# Patient Record
Sex: FEMALE | Race: OTHER | HISPANIC OR LATINO | Employment: UNEMPLOYED | URBAN - METROPOLITAN AREA
[De-identification: names, ages, dates, MRNs, and addresses within clinical notes are randomized per-mention and may not be internally consistent; named-entity substitution may affect disease eponyms.]

---

## 2021-06-08 ENCOUNTER — HOSPITAL ENCOUNTER (EMERGENCY)
Facility: HOSPITAL | Age: 61
Discharge: HOME | End: 2021-06-08
Attending: EMERGENCY MEDICINE | Admitting: EMERGENCY MEDICINE

## 2021-06-08 ENCOUNTER — APPOINTMENT (EMERGENCY)
Dept: RADIOLOGY | Facility: HOSPITAL | Age: 61
End: 2021-06-08

## 2021-06-08 VITALS
SYSTOLIC BLOOD PRESSURE: 131 MMHG | HEART RATE: 67 BPM | BODY MASS INDEX: 29.02 KG/M2 | OXYGEN SATURATION: 98 % | TEMPERATURE: 98.4 F | DIASTOLIC BLOOD PRESSURE: 64 MMHG | WEIGHT: 163.8 LBS | HEIGHT: 63 IN | RESPIRATION RATE: 18 BRPM

## 2021-06-08 DIAGNOSIS — R42 LIGHTHEADEDNESS: Primary | ICD-10-CM

## 2021-06-08 LAB
ABO GROUP BLD: NORMAL
ABO GROUP BLD: NORMAL
ANION GAP SERPL CALCULATED.3IONS-SCNC: 15 MMOL/L (ref 4–13)
APTT PPP: 30 SECONDS (ref 23–37)
ATRIAL RATE: 77 BPM
BASOPHILS # BLD AUTO: 0.04 THOUSANDS/ΜL (ref 0–0.1)
BASOPHILS NFR BLD AUTO: 1 % (ref 0–1)
BLD GP AB SCN SERPL QL: NEGATIVE
BUN SERPL-MCNC: 16 MG/DL (ref 5–25)
CALCIUM SERPL-MCNC: 8.9 MG/DL (ref 8.3–10.1)
CHLORIDE SERPL-SCNC: 104 MMOL/L (ref 100–108)
CO2 SERPL-SCNC: 25 MMOL/L (ref 21–32)
CREAT SERPL-MCNC: 0.65 MG/DL (ref 0.6–1.3)
EOSINOPHIL # BLD AUTO: 0.15 THOUSAND/ΜL (ref 0–0.61)
EOSINOPHIL NFR BLD AUTO: 2 % (ref 0–6)
ERYTHROCYTE [DISTWIDTH] IN BLOOD BY AUTOMATED COUNT: 17.2 % (ref 11.6–15.1)
GFR SERPL CREATININE-BSD FRML MDRD: 97 ML/MIN/1.73SQ M
GLUCOSE SERPL-MCNC: 113 MG/DL (ref 65–140)
HCT VFR BLD AUTO: 41.2 % (ref 34.8–46.1)
HGB BLD-MCNC: 11.9 G/DL (ref 11.5–15.4)
IMM GRANULOCYTES # BLD AUTO: 0.01 THOUSAND/UL (ref 0–0.2)
IMM GRANULOCYTES NFR BLD AUTO: 0 % (ref 0–2)
INR PPP: 1 (ref 0.84–1.19)
LYMPHOCYTES # BLD AUTO: 1.67 THOUSANDS/ΜL (ref 0.6–4.47)
LYMPHOCYTES NFR BLD AUTO: 27 % (ref 14–44)
MCH RBC QN AUTO: 21 PG (ref 26.8–34.3)
MCHC RBC AUTO-ENTMCNC: 28.9 G/DL (ref 31.4–37.4)
MCV RBC AUTO: 73 FL (ref 82–98)
MONOCYTES # BLD AUTO: 0.7 THOUSAND/ΜL (ref 0.17–1.22)
MONOCYTES NFR BLD AUTO: 11 % (ref 4–12)
NEUTROPHILS # BLD AUTO: 3.57 THOUSANDS/ΜL (ref 1.85–7.62)
NEUTS SEG NFR BLD AUTO: 59 % (ref 43–75)
NRBC BLD AUTO-RTO: 0 /100 WBCS
P AXIS: 70 DEGREES
PLATELET # BLD AUTO: 232 THOUSANDS/UL (ref 149–390)
PMV BLD AUTO: 10.9 FL (ref 8.9–12.7)
POTASSIUM SERPL-SCNC: 3.7 MMOL/L (ref 3.5–5.3)
PR INTERVAL: 146 MS
PROTHROMBIN TIME: 13.4 SECONDS (ref 11.6–14.5)
QRS AXIS: 56 DEGREES
QRSD INTERVAL: 84 MS
QT INTERVAL: 396 MS
QTC INTERVAL: 448 MS
RBC # BLD AUTO: 5.67 MILLION/UL (ref 3.81–5.12)
RH BLD: POSITIVE
RH BLD: POSITIVE
SODIUM SERPL-SCNC: 144 MMOL/L (ref 136–145)
SPECIMEN EXPIRATION DATE: NORMAL
T WAVE AXIS: 36 DEGREES
TROPONIN I SERPL-MCNC: <0.02 NG/ML
VENTRICULAR RATE: 77 BPM
WBC # BLD AUTO: 6.14 THOUSAND/UL (ref 4.31–10.16)

## 2021-06-08 PROCEDURE — 85730 THROMBOPLASTIN TIME PARTIAL: CPT | Performed by: PHYSICIAN ASSISTANT

## 2021-06-08 PROCEDURE — 99284 EMERGENCY DEPT VISIT MOD MDM: CPT

## 2021-06-08 PROCEDURE — 85025 COMPLETE CBC W/AUTO DIFF WBC: CPT | Performed by: PHYSICIAN ASSISTANT

## 2021-06-08 PROCEDURE — 85610 PROTHROMBIN TIME: CPT | Performed by: PHYSICIAN ASSISTANT

## 2021-06-08 PROCEDURE — 93010 ELECTROCARDIOGRAM REPORT: CPT | Performed by: INTERNAL MEDICINE

## 2021-06-08 PROCEDURE — 93005 ELECTROCARDIOGRAM TRACING: CPT

## 2021-06-08 PROCEDURE — 86850 RBC ANTIBODY SCREEN: CPT | Performed by: EMERGENCY MEDICINE

## 2021-06-08 PROCEDURE — 71045 X-RAY EXAM CHEST 1 VIEW: CPT

## 2021-06-08 PROCEDURE — 86901 BLOOD TYPING SEROLOGIC RH(D): CPT | Performed by: EMERGENCY MEDICINE

## 2021-06-08 PROCEDURE — 99285 EMERGENCY DEPT VISIT HI MDM: CPT | Performed by: PHYSICIAN ASSISTANT

## 2021-06-08 PROCEDURE — 86900 BLOOD TYPING SEROLOGIC ABO: CPT | Performed by: EMERGENCY MEDICINE

## 2021-06-08 PROCEDURE — 84484 ASSAY OF TROPONIN QUANT: CPT | Performed by: PHYSICIAN ASSISTANT

## 2021-06-08 PROCEDURE — 80048 BASIC METABOLIC PNL TOTAL CA: CPT | Performed by: PHYSICIAN ASSISTANT

## 2021-06-08 PROCEDURE — 36415 COLL VENOUS BLD VENIPUNCTURE: CPT | Performed by: PHYSICIAN ASSISTANT

## 2021-06-08 NOTE — DISCHARGE INSTRUCTIONS
Drink plenty of fluids and stay hydrated  Please follow-up with your family doctor  Return to the ER with any worsening symptoms

## 2021-06-08 NOTE — ED PROVIDER NOTES
History  Chief Complaint   Patient presents with    Dizziness     reports feeling lightheaded for a few days, hx of anemia requiring infusions  reports similar symptoms when she needs infusion  reports she has not been taking her iron supplement     67yo female with a history of anemia presenting for evaluation of lightheadedness x 3 weeks  Patient admits to intermittent dizziness for several weeks  She denies any known triggers and states she can just be sitting in a chair when she feels lightheaded  She is worried that she is anemic because she's had similar symptoms in the past and has required blood transfusions  Patient also reports intermittent substernal chest discomfort and dyspnea  She is prescribed an iron supplement but breaks the pill into pieces and only takes a small piece per day because the iron supplements give her constipation  Patient's anemia was reportedly thought to be related to heavy menstrual periods and uterine fibroids  Patient has not had a menstrual period in about a year  She runs daily and denies any dyspnea or dizziness with exertion  History provided by:  Patient   used: No    Dizziness  Quality:  Lightheadedness  Severity:  Moderate  Onset quality:  Gradual  Duration:  3 weeks  Timing:  Intermittent  Progression:  Unchanged  Chronicity:  Recurrent  Relieved by:  Nothing  Worsened by:  Nothing  Ineffective treatments:  None tried  Associated symptoms: chest pain and shortness of breath    Associated symptoms: no blood in stool, no diarrhea, no headaches, no nausea, no palpitations, no syncope, no vision changes, no vomiting and no weakness    Risk factors: anemia        None       Past Medical History:   Diagnosis Date    Anemia     Gallstones        History reviewed  No pertinent surgical history  History reviewed  No pertinent family history  I have reviewed and agree with the history as documented      E-Cigarette/Vaping    E-Cigarette Use Never User      E-Cigarette/Vaping Substances     Social History     Tobacco Use    Smoking status: Never Smoker    Smokeless tobacco: Never Used   Substance Use Topics    Alcohol use: Yes     Frequency: 4 or more times a week     Drinks per session: 1 or 2     Comment: daily glass of wine    Drug use: Never       Review of Systems   Constitutional: Negative for chills and fever  HENT: Negative for drooling and voice change  Eyes: Negative for discharge and redness  Respiratory: Positive for shortness of breath  Negative for stridor  Cardiovascular: Positive for chest pain  Negative for palpitations, leg swelling and syncope  Gastrointestinal: Negative for blood in stool, diarrhea, nausea and vomiting  Musculoskeletal: Negative for neck pain and neck stiffness  Skin: Negative for color change and rash  Neurological: Positive for dizziness and light-headedness  Negative for seizures, syncope, weakness and headaches  Psychiatric/Behavioral: Negative for confusion  The patient is not nervous/anxious  All other systems reviewed and are negative  Physical Exam  Physical Exam  Vitals signs and nursing note reviewed  Constitutional:       General: She is not in acute distress  Appearance: She is well-developed  She is not diaphoretic  Comments: Non-toxic appearing   HENT:      Head: Normocephalic and atraumatic  Right Ear: External ear normal       Left Ear: External ear normal       Nose: Nose normal    Eyes:      General: No scleral icterus  Right eye: No discharge  Left eye: No discharge  Conjunctiva/sclera: Conjunctivae normal    Neck:      Musculoskeletal: Normal range of motion and neck supple  Cardiovascular:      Rate and Rhythm: Normal rate and regular rhythm  Heart sounds: Normal heart sounds  No murmur  Pulmonary:      Effort: Pulmonary effort is normal  No respiratory distress  Breath sounds: Normal breath sounds  No stridor   No wheezing or rales  Abdominal:      General: Bowel sounds are normal  There is no distension  Palpations: Abdomen is soft  Tenderness: There is no abdominal tenderness  There is no guarding  Musculoskeletal: Normal range of motion  General: No deformity  Lymphadenopathy:      Cervical: No cervical adenopathy  Skin:     General: Skin is warm and dry  Neurological:      Mental Status: She is alert  She is not disoriented  GCS: GCS eye subscore is 4  GCS verbal subscore is 5  GCS motor subscore is 6     Psychiatric:         Behavior: Behavior normal          Vital Signs  ED Triage Vitals   Temperature Pulse Respirations Blood Pressure SpO2   06/08/21 1505 06/08/21 1505 06/08/21 1505 06/08/21 1505 06/08/21 1505   98 4 °F (36 9 °C) 81 16 134/87 99 %      Temp src Heart Rate Source Patient Position - Orthostatic VS BP Location FiO2 (%)   -- 06/08/21 1715 06/08/21 1715 06/08/21 1715 --    Monitor Lying Right arm       Pain Score       06/08/21 1505       No Pain           Vitals:    06/08/21 1505 06/08/21 1715   BP: 134/87 131/64   Pulse: 81 67   Patient Position - Orthostatic VS:  Lying         Visual Acuity      ED Medications  Medications - No data to display    Diagnostic Studies  Results Reviewed     Procedure Component Value Units Date/Time    Troponin I [[id number]]  (Normal) Collected: 06/08/21 1635    Lab Status: Final result Specimen: Blood from Arm, Left Updated: 06/08/21 1706     Troponin I <0 02 ng/mL     Protime-INR [[id number]]  (Normal) Collected: 06/08/21 1635    Lab Status: Final result Specimen: Blood from Arm, Left Updated: 06/08/21 1703     Protime 13 4 seconds      INR 1 00    APTT [[id number]]  (Normal) Collected: 06/08/21 1635    Lab Status: Final result Specimen: Blood from Arm, Left Updated: 06/08/21 1703     PTT 30 seconds     Basic metabolic panel [[id number]]  (Abnormal) Collected: 06/08/21 1635    Lab Status: Final result Specimen: Blood from Arm, Left Updated: 06/08/21 1659     Sodium 144 mmol/L      Potassium 3 7 mmol/L      Chloride 104 mmol/L      CO2 25 mmol/L      ANION GAP 15 mmol/L      BUN 16 mg/dL      Creatinine 0 65 mg/dL      Glucose 113 mg/dL      Calcium 8 9 mg/dL      eGFR 97 ml/min/1 73sq m     Narrative:      [place] guidelines for Chronic Kidney Disease (CKD):     Stage 1 with normal or high GFR (GFR > 90 mL/min/1 73 square meters)    Stage 2 Mild CKD (GFR = 60-89 mL/min/1 73 square meters)    Stage 3A Moderate CKD (GFR = 45-59 mL/min/1 73 square meters)    Stage 3B Moderate CKD (GFR = 30-44 mL/min/1 73 square meters)    Stage 4 Severe CKD (GFR = 15-29 mL/min/1 73 square meters)    Stage 5 End Stage CKD (GFR <15 mL/min/1 73 square meters)  Note: GFR calculation is accurate only with a steady state creatinine    CBC and differential [[id number]]  (Abnormal) Collected: 06/08/21 1635    Lab Status: Final result Specimen: Blood from Arm, Left Updated: 06/08/21 1646     WBC 6 14 Thousand/uL      RBC 5 67 Million/uL      Hemoglobin 11 9 g/dL      Hematocrit 41 2 %      MCV 73 fL      MCH 21 0 pg      MCHC 28 9 g/dL      RDW 17 2 %      MPV 10 9 fL      Platelets 347 Thousands/uL      nRBC 0 /100 WBCs      Neutrophils Relative 59 %      Immat GRANS % 0 %      Lymphocytes Relative 27 %      Monocytes Relative 11 %      Eosinophils Relative 2 %      Basophils Relative 1 %      Neutrophils Absolute 3 57 Thousands/µL      Immature Grans Absolute 0 01 Thousand/uL      Lymphocytes Absolute 1 67 Thousands/µL      Monocytes Absolute 0 70 Thousand/µL      Eosinophils Absolute 0 15 Thousand/µL      Basophils Absolute 0 04 Thousands/µL                  XR chest 1 view portable   ED Interpretation by Katarina Fletcher PA-C (06/08 1649)   No acute abnormality                 Procedures  ECG 12 Lead Documentation Only    Date/Time: 6/8/2021 4:39 PM  Performed by: Katarina Fletcher PA-C  Authorized by: Katarina Fletcher PA-C Indications / Diagnosis:  Dizziness  ECG reviewed by me, the ED Provider: yes    Patient location:  ED  Previous ECG:     Previous ECG:  Unavailable  Interpretation:     Interpretation: normal    Rate:     ECG rate:  77    ECG rate assessment: normal    Rhythm:     Rhythm: sinus rhythm    Ectopy:     Ectopy: none    QRS:     QRS axis:  Normal    QRS intervals:  Normal  Conduction:     Conduction: normal    ST segments:     ST segments:  Normal  T waves:     T waves: normal               ED Course  ED Course as of Jun 08 1905   Tue Jun 08, 2021   1646 Hemoglobin: 11 9                     MDM  Number of Diagnoses or Management Options  Lightheadedness: new and requires workup  Diagnosis management comments: 67yo female presenting for intermittent lightheadedness x 3 weeks  Also is having periodic chest pains  Patient is worried that she may be anemia as she has required transfusions in the past  Patient is afebrile and hemodynamically stable  Differential diagnosis includes but is not limited to: symptomatic anemia, dehydration, hypotension, ACS    Initial ED plan: Check cardiac labs, coags, type and screen, EKG, and CXR  Final assessment: Labs unremarkable  Hemoglobin is normal at 11 9, no signs of anemia  Electrolytes, renal function, coags are normal  Troponin normal and EKG reveals NSR without ischemic changes  CXR is unremarkable per my interpretation  No indication for admission at this time  PCP referral provided for f/u  ED return precautions discussed  Patient expressed understanding and is agreeable to plan  Patient discharged in stable condition           Amount and/or Complexity of Data Reviewed  Clinical lab tests: ordered and reviewed  Tests in the radiology section of CPT®: reviewed and ordered  Tests in the medicine section of CPT®: ordered and reviewed  Review and summarize past medical records: yes  Independent visualization of images, tracings, or specimens: yes    Risk of Complications, Morbidity, and/or Mortality  Presenting problems: moderate  Diagnostic procedures: moderate  Management options: moderate    Patient Progress  Patient progress: stable      Disposition  Final diagnoses:   Lightheadedness     Time reflects when diagnosis was documented in both MDM as applicable and the Disposition within this note     Time User Action Codes Description Comment    6/8/2021  5:15 PM [address] [place] [address] Pam [[id number]] [facility]       ED Disposition     ED Disposition Condition Date/Time Comment    Discharge Stable Tue Jun 8, 2021  5:15 PM Ara Kwok discharge to home/self care  Follow-up Information     Follow up With Specialties Details Why Contact Info Additional 94 [facility] Internal Medicine Schedule an appointment as soon as possible for a visit   35[address] [facility] [address] [facility] [id number]  [facility] [address], [address] [address], [place], [place], [address]   [address] Willian Barton MD General Surgery Schedule an appointment as soon as possible for a visit   16[address] [facility]  [address] Emergency Department Emergency Medicine  If symptoms worsen [address] Emergency Department, [address], [place], [address]          There are no discharge medications for this patient  No discharge procedures on file      PDMP Review     None          ED Provider  Electronically Signed by           Hayley Dsouza PA-C  06/08/21 0554

## 2021-06-11 ENCOUNTER — IMMUNIZATIONS (OUTPATIENT)
Dept: FAMILY MEDICINE CLINIC | Facility: HOSPITAL | Age: 61
End: 2021-06-11

## 2021-06-11 ENCOUNTER — HOSPITAL ENCOUNTER (EMERGENCY)
Facility: HOSPITAL | Age: 61
Discharge: HOME | End: 2021-06-11
Attending: EMERGENCY MEDICINE | Admitting: EMERGENCY MEDICINE

## 2021-06-11 VITALS
RESPIRATION RATE: 16 BRPM | OXYGEN SATURATION: 100 % | SYSTOLIC BLOOD PRESSURE: 176 MMHG | TEMPERATURE: 97.9 F | DIASTOLIC BLOOD PRESSURE: 81 MMHG | HEART RATE: 60 BPM

## 2021-06-11 DIAGNOSIS — S40.021A SUPERFICIAL BRUISING OF ARM, RIGHT, INITIAL ENCOUNTER: Primary | ICD-10-CM

## 2021-06-11 PROCEDURE — 0031A: CPT

## 2021-06-11 PROCEDURE — 91303: CPT

## 2021-06-11 PROCEDURE — 99283 EMERGENCY DEPT VISIT LOW MDM: CPT

## 2021-06-11 PROCEDURE — 99282 EMERGENCY DEPT VISIT SF MDM: CPT | Performed by: PHYSICIAN ASSISTANT

## 2021-06-12 NOTE — ED PROVIDER NOTES
History  Chief Complaint   Patient presents with    Arm Pain     pt states she had her j and j vaccine today R arm and now has ecchymosis R forearm  denies injury  62 yo female here with bruising of right arm  Had J&J covid vaccine today  Contacted the help line and ultimately spoke with the FDA who recommended she to go ER to r/o blood clot  She has minimal pain around bruising  Bruising is in forearm, distal to where her shot was given  No other complaints  No swelling  History provided by:  Patient   used: No    Arm Pain  Location:  Right forearm  Quality:  Bruising  Severity:  Mild  Onset quality:  Sudden  Duration:  1 day  Timing:  Constant  Progression:  Unchanged  Chronicity:  New  Associated symptoms: no abdominal pain, no chest pain, no cough, no ear pain, no fever, no rash, no shortness of breath, no sore throat and no vomiting        None       Past Medical History:   Diagnosis Date    Anemia     Gallstones        No past surgical history on file  No family history on file  I have reviewed and agree with the history as documented  E-Cigarette/Vaping    E-Cigarette Use Never User      E-Cigarette/Vaping Substances     Social History     Tobacco Use    Smoking status: Never Smoker    Smokeless tobacco: Never Used   Substance Use Topics    Alcohol use: Yes     Frequency: 4 or more times a week     Drinks per session: 1 or 2     Comment: daily glass of wine    Drug use: Never       Review of Systems   Constitutional: Negative for chills and fever  HENT: Negative for ear pain and sore throat  Eyes: Negative for pain and visual disturbance  Respiratory: Negative for cough and shortness of breath  Cardiovascular: Negative for chest pain and palpitations  Gastrointestinal: Negative for abdominal pain and vomiting  Genitourinary: Negative for dysuria and hematuria  Musculoskeletal: Negative for arthralgias and back pain     Skin: Negative for color change and rash  Neurological: Negative for seizures and syncope  All other systems reviewed and are negative  Physical Exam  Physical Exam  Vitals signs and nursing note reviewed  Constitutional:       General: She is not in acute distress  Appearance: She is well-developed  HENT:      Head: Normocephalic and atraumatic  Eyes:      Conjunctiva/sclera: Conjunctivae normal    Neck:      Musculoskeletal: Neck supple  Cardiovascular:      Rate and Rhythm: Normal rate and regular rhythm  Heart sounds: No murmur  Pulmonary:      Effort: Pulmonary effort is normal  No respiratory distress  Breath sounds: Normal breath sounds  Abdominal:      Palpations: Abdomen is soft  Tenderness: There is no abdominal tenderness  Musculoskeletal:      Left forearm: She exhibits tenderness  Arms:    Skin:     General: Skin is warm and dry  Neurological:      Mental Status: She is alert  Comments: GCS 15  AAOx3  Ambulating in department without difficulty  CN II-XII grossly intact  No focal neuro deficits             Vital Signs  ED Triage Vitals [06/11/21 2201]   Temperature Pulse Respirations Blood Pressure SpO2   97 9 °F (36 6 °C) 60 16 (!) 176/81 100 %      Temp Source Heart Rate Source Patient Position - Orthostatic VS BP Location FiO2 (%)   Oral Monitor Sitting Right arm --      Pain Score       --           Vitals:    06/11/21 2201   BP: (!) 176/81   Pulse: 60   Patient Position - Orthostatic VS: Sitting         Visual Acuity      ED Medications  Medications - No data to display    Diagnostic Studies  Results Reviewed     None                 No orders to display              Procedures  Procedures         ED Course                                           MDM  Number of Diagnoses or Management Options  Superficial bruising of arm, right, initial encounter: new and requires workup  Diagnosis management comments: ddx includes but is not limited to bruising, sprain, strain, doubt DVT, doubt coagulopathy  Risk of Complications, Morbidity, and/or Mortality  Presenting problems: low  Management options: low  General comments: Plan/MDM: 62 yo female with bruising of RUE  Small area  Minimal tenderness  Not consistent with DVT  Recommended conservative management  She made mention of sob however states this has been ongoing for weeks and was evaluated here in this ED 3 days ago for the same problem and had a normal workup  Defer repeat workup at this time  Recommended RICE to the area of bruising  Return parameters provided  Pt understands and agrees with plan  Patient Progress  Patient progress: stable      Disposition  Final diagnoses:   Superficial bruising of arm, right, initial encounter     Time reflects when diagnosis was documented in both MDM as applicable and the Disposition within this note     Time User Action Codes Description Comment    6/11/2021 10:21 PM Geraldine Aleksander Add [S40 021A] Superficial bruising of arm, right, initial encounter       ED Disposition     ED Disposition Condition Date/Time Comment    Discharge Stable Fri Jun 11, 2021 10:21 PM Natasha Ogden discharge to home/self care  Follow-up Information     Follow up With Specialties Details Why Contact Info Additional 2000 [facility] Emergency Department Emergency Medicine Go to  If symptoms worsen [address] [place] [id number] [facility] Emergency Department, [address] [place] [address], [address]          Patient's Medications    No medications on file     No discharge procedures on file      PDMP Review     None          ED Provider  Electronically Signed by           Aurora Sifuentes PA-C  06/11/21 2109

## 2022-09-15 ENCOUNTER — OFFICE VISIT (OUTPATIENT)
Dept: INTERNAL MEDICINE CLINIC | Facility: CLINIC | Age: 62
End: 2022-09-15
Payer: COMMERCIAL

## 2022-09-15 ENCOUNTER — APPOINTMENT (OUTPATIENT)
Dept: LAB | Facility: CLINIC | Age: 62
End: 2022-09-15
Payer: COMMERCIAL

## 2022-09-15 VITALS
TEMPERATURE: 98 F | OXYGEN SATURATION: 96 % | BODY MASS INDEX: 30.12 KG/M2 | HEIGHT: 63 IN | HEART RATE: 82 BPM | DIASTOLIC BLOOD PRESSURE: 84 MMHG | WEIGHT: 170 LBS | SYSTOLIC BLOOD PRESSURE: 138 MMHG

## 2022-09-15 DIAGNOSIS — Z11.4 SCREENING FOR HIV (HUMAN IMMUNODEFICIENCY VIRUS): ICD-10-CM

## 2022-09-15 DIAGNOSIS — E04.1 THYROID NODULE: ICD-10-CM

## 2022-09-15 DIAGNOSIS — Z11.59 NEED FOR HEPATITIS C SCREENING TEST: ICD-10-CM

## 2022-09-15 DIAGNOSIS — Z00.00 ANNUAL PHYSICAL EXAM: ICD-10-CM

## 2022-09-15 DIAGNOSIS — R01.1 CARDIAC MURMUR: ICD-10-CM

## 2022-09-15 DIAGNOSIS — Z00.00 ANNUAL PHYSICAL EXAM: Primary | ICD-10-CM

## 2022-09-15 DIAGNOSIS — Z12.31 ENCOUNTER FOR SCREENING MAMMOGRAM FOR BREAST CANCER: ICD-10-CM

## 2022-09-15 DIAGNOSIS — Z12.11 SCREEN FOR COLON CANCER: ICD-10-CM

## 2022-09-15 DIAGNOSIS — J02.9 SORE THROAT: ICD-10-CM

## 2022-09-15 LAB
ALBUMIN SERPL BCP-MCNC: 4 G/DL (ref 3.5–5)
ALP SERPL-CCNC: 107 U/L (ref 46–116)
ALT SERPL W P-5'-P-CCNC: 206 U/L (ref 12–78)
ANION GAP SERPL CALCULATED.3IONS-SCNC: 5 MMOL/L (ref 4–13)
AST SERPL W P-5'-P-CCNC: 151 U/L (ref 5–45)
BILIRUB SERPL-MCNC: 0.91 MG/DL (ref 0.2–1)
BUN SERPL-MCNC: 11 MG/DL (ref 5–25)
CALCIUM SERPL-MCNC: 9.6 MG/DL (ref 8.3–10.1)
CHLORIDE SERPL-SCNC: 106 MMOL/L (ref 96–108)
CHOLEST SERPL-MCNC: 220 MG/DL
CO2 SERPL-SCNC: 28 MMOL/L (ref 21–32)
CREAT SERPL-MCNC: 0.64 MG/DL (ref 0.6–1.3)
GFR SERPL CREATININE-BSD FRML MDRD: 97 ML/MIN/1.73SQ M
GLUCOSE P FAST SERPL-MCNC: 108 MG/DL (ref 65–99)
HDLC SERPL-MCNC: 46 MG/DL
LDLC SERPL CALC-MCNC: 147 MG/DL (ref 0–100)
POTASSIUM SERPL-SCNC: 4.4 MMOL/L (ref 3.5–5.3)
PROT SERPL-MCNC: 9 G/DL (ref 6.4–8.4)
SODIUM SERPL-SCNC: 139 MMOL/L (ref 135–147)
TRIGL SERPL-MCNC: 135 MG/DL
TSH SERPL DL<=0.05 MIU/L-ACNC: 0.66 UIU/ML (ref 0.45–4.5)

## 2022-09-15 PROCEDURE — 80053 COMPREHEN METABOLIC PANEL: CPT

## 2022-09-15 PROCEDURE — 86803 HEPATITIS C AB TEST: CPT

## 2022-09-15 PROCEDURE — 84443 ASSAY THYROID STIM HORMONE: CPT

## 2022-09-15 PROCEDURE — 80061 LIPID PANEL: CPT

## 2022-09-15 PROCEDURE — 36415 COLL VENOUS BLD VENIPUNCTURE: CPT

## 2022-09-15 PROCEDURE — 99386 PREV VISIT NEW AGE 40-64: CPT | Performed by: FAMILY MEDICINE

## 2022-09-15 PROCEDURE — 87389 HIV-1 AG W/HIV-1&-2 AB AG IA: CPT

## 2022-09-15 RX ORDER — ACETAMINOPHEN 325 MG/1
325 TABLET ORAL EVERY 6 HOURS PRN
COMMUNITY

## 2022-09-15 RX ORDER — CETIRIZINE HYDROCHLORIDE 10 MG/1
10 TABLET ORAL DAILY
COMMUNITY
Start: 2022-08-17 | End: 2023-08-17

## 2022-09-15 RX ORDER — ZINC GLUCONATE 50 MG
50 TABLET ORAL DAILY
COMMUNITY

## 2022-09-15 RX ORDER — FLUTICASONE PROPIONATE 50 MCG
SPRAY, SUSPENSION (ML) NASAL
COMMUNITY
Start: 2022-08-17

## 2022-09-15 NOTE — PATIENT INSTRUCTIONS

## 2022-09-15 NOTE — PROGRESS NOTES
ADULT ANNUAL [address]     NAME: Gilbert Cast  AGE: 63 y o  SEX: female  : 1960     DATE: 9/15/2022     Assessment and Plan:     Problem List Items Addressed This Visit    None     Visit Diagnoses     Annual physical exam    -  Primary    Relevant Orders    Comprehensive metabolic panel    Lipid Panel with Direct LDL reflex    Need for hepatitis C screening test        Relevant Orders    Hepatitis C Antibody ([facility])    Screening for HIV (human immunodeficiency virus)        Relevant Orders    HIV 1/2 Antigen/Antibody (4th Generation) w Reflex [facility]    Encounter for screening mammogram for breast cancer        Relevant Orders    Mammo screening bilateral w 3d & cad    Screen for colon cancer        Relevant Orders    Cologuard    Thyroid nodule        Relevant Orders    TSH, 3rd generation with Free T4 reflex    US thyroid    Sore throat        Relevant Medications    benzocaine (HURRICAINE) 20 % mucosal spray    Cardiac murmur        Relevant Orders    Echo complete w/ contrast if indicated      small thyroid nodule vs shoddy ant  Cervical chain node  Minimal tenderness  Will obtain u/s and labs studies  pharyngitis differential include infectious, allergic, or inflammatory 2/2 reflux  Allergy medications haven't alleviated it  Duration has been over a month so likely not infectious  Will provide analgesia  Discussed with pt to trial otc gerd therapy  Screening studies ordered   Pt reports known cardiac murmur but unsure concerning previous work up  Immunizations and preventive care screenings were discussed with patient today  Appropriate education was printed on patient's after visit summary  Counseling:  · Dental Health: discussed importance of regular tooth brushing, flossing, and dental visits  No follow-ups on file       Chief Complaint:     Chief Complaint   Patient presents with   [facility]  History of Present Illness:     Adult Annual Physical   Patient here for a comprehensive physical exam  The patient reports problems -   C/o throat pain  Onset for months  Has been usign zyrtec and flonase but it hsan't gone away  Has some discomfort when she swallows  Feels there is an associated lump in the neck  It has become bigger  Denies family hx of breast or colon cancer  Denies family hx of dm2, htn, cad  Diet and Physical Activity  · Diet/Nutrition: well balanced diet  · Exercise: walking  Depression Screening  PHQ-2/9 Depression Screening    Little interest or pleasure in doing things: 0 - not at all  Feeling down, depressed, or hopeless: 1 - several days  PHQ-2 Score: 1  PHQ-2 Interpretation: Negative depression screen       General Health  · Sleep: sleeps poorly  · Hearing: normal - bilateral   · Vision: most recent eye exam <1 year ago and wears glasses  · Dental: no dental visits for >1 year  /GYN Health  · Patient is: postmenopausal  · Last mammo- years  · Last pap- 4 years ago  · Colon cancer screeing- never  Review of Systems:     Review of Systems   Constitutional: Positive for unexpected weight change  Negative for fever  Eyes: Positive for visual disturbance  Respiratory: Negative for shortness of breath  Cardiovascular: Negative for leg swelling  Gastrointestinal: Negative for constipation  Musculoskeletal: Negative for gait problem  Neurological: Negative for light-headedness  Psychiatric/Behavioral: Positive for sleep disturbance  Past Medical History:     Past Medical History:   Diagnosis Date    Anemia     Gallstones       Past Surgical History:     History reviewed  No pertinent surgical history     Social History:     Social History     Socioeconomic History    Marital status: /Civil Union     Spouse name: None    Number of children: None    Years of education: None    Highest education level: None   Occupational History    None   Tobacco Use    Smoking status: Former Smoker     Packs/day: 0 25     Start date:      Quit date:      Years since quittin 7    Smokeless tobacco: Never Used   Vaping Use    Vaping Use: Never used   Substance and Sexual Activity    Alcohol use: Yes     Alcohol/week: 4 0 standard drinks     Types: 4 Glasses of wine per week     Comment: daily glass of wine    Drug use: Never    Sexual activity: Not Currently   Other Topics Concern    None   Social History Narrative    None     Social Determinants of Health     Financial Resource Strain: Not on file   Food Insecurity: Not on file   Transportation Needs: Not on file   Physical Activity: Not on file   Stress: Not on file   Social Connections: Not on file   Intimate Partner Violence: Not on file   Housing Stability: Not on file      Family History:     Family History   Problem Relation Age of Onset    Diabetes Mother     Hypertension Mother     No Known Problems Father     HIV Sister       Current Medications:     Current Outpatient Medications   Medication Sig Dispense Refill    acetaminophen (TYLENOL) 325 mg tablet Take 325 mg by mouth every 6 (six) hours as needed for mild pain      benzocaine (HURRICAINE) 20 % mucosal spray 2 sprays by Mucosal route once for 1 dose 2 each 0    cetirizine (ZyrTEC) 10 mg tablet Take 10 mg by mouth daily      cyanocobalamin (VITAMIN B-12) 100 MCG tablet Take 100 mcg by mouth daily      fluticasone (FLONASE) 50 mcg/act nasal spray SPRAY 2 SPRAYS INTO EACH NOSTRIL EVERY DAY      zinc gluconate 50 mg tablet Take 50 mg by mouth daily       No current facility-administered medications for this visit  Allergies:     No Known Allergies   Physical Exam:     /84   Pulse 82   Temp 98 °F (36 7 °C) (Tympanic)   Ht 5' 2 75" (1 594 m)   Wt 77 1 kg (170 lb)   SpO2 96%   BMI 30 35 kg/m²     Physical Exam  HENT:      Head: Normocephalic and atraumatic        Right Ear: External ear normal  Left Ear: External ear normal    Eyes:      Conjunctiva/sclera: Conjunctivae normal       Pupils: Pupils are equal, round, and reactive to light  Neck:      Thyroid: Thyroid mass and thyroid tenderness present  Cardiovascular:      Rate and Rhythm: Normal rate  Heart sounds: Murmur (2/6) heard  Pulmonary:      Effort: Pulmonary effort is normal       Breath sounds: Normal breath sounds  Abdominal:      Palpations: Abdomen is soft  Musculoskeletal:      Cervical back: Neck supple  No rigidity or tenderness  Right lower leg: No edema  Left lower leg: No edema  Neurological:      Mental Status: She is alert and oriented to person, place, and time        Gait: Gait normal    Psychiatric:         Mood and Affect: Mood normal          Behavior: Behavior normal           Devin Cook, DO  MEDICAL [id number]

## 2022-09-16 LAB
HCV AB SER QL: NORMAL
HIV 1+2 AB+HIV1 P24 AG SERPL QL IA: NORMAL

## 2022-09-29 ENCOUNTER — TELEPHONE (OUTPATIENT)
Dept: INTERNAL MEDICINE CLINIC | Facility: CLINIC | Age: 62
End: 2022-09-29

## 2022-09-29 NOTE — TELEPHONE ENCOUNTER
----- Message from Nikole Baez DO sent at 9/29/2022 11:11 AM EDT -----  Overall blood work shows elevation in cholesterol and liver enzymes  Will repeat liver enzymes in 6 weeks for continued monitoring  If still elevated will evaluate with ultrasound    Patient is encouraged to decrease cholesterol intake through lessening red meats, fats, and dairy

## 2022-10-28 LAB — COLOGUARD RESULT REPORTABLE: NEGATIVE

## 2022-11-02 ENCOUNTER — TELEPHONE (OUTPATIENT)
Dept: INTERNAL MEDICINE CLINIC | Facility: CLINIC | Age: 62
End: 2022-11-02

## 2022-11-02 NOTE — TELEPHONE ENCOUNTER
----- Message from Joya Gonzalez DO sent at 11/1/2022  6:17 PM EDT -----  Negative colon cancer screening

## 2023-06-30 ENCOUNTER — TELEPHONE (OUTPATIENT)
Age: 63
End: 2023-06-30

## 2023-06-30 NOTE — TELEPHONE ENCOUNTER
I spoke with the pt states she has not seen Rodrigo Otero in a year or so  Connection was bad called has dropped left voice mssg and will send out mammo letter and order for the pt

## 2024-01-23 DIAGNOSIS — Z12.31 ENCOUNTER FOR SCREENING MAMMOGRAM FOR MALIGNANT NEOPLASM OF BREAST: Primary | ICD-10-CM

## 2024-09-18 ENCOUNTER — TELEPHONE (OUTPATIENT)
Age: 64
End: 2024-09-18